# Patient Record
Sex: FEMALE | Race: WHITE | Employment: UNEMPLOYED | URBAN - METROPOLITAN AREA
[De-identification: names, ages, dates, MRNs, and addresses within clinical notes are randomized per-mention and may not be internally consistent; named-entity substitution may affect disease eponyms.]

---
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
## 2025-04-22 NOTE — PROGRESS NOTES
Results slightly abnormally but do not strongly suggest urinary tract infection. Will send urine culture to help us decide if treatment is indicated, expect result in a few days. no chest pain/no palpitations no chest pain/no palpitations no chest pain/no palpitations